# Patient Record
Sex: MALE | Race: WHITE | NOT HISPANIC OR LATINO | URBAN - METROPOLITAN AREA
[De-identification: names, ages, dates, MRNs, and addresses within clinical notes are randomized per-mention and may not be internally consistent; named-entity substitution may affect disease eponyms.]

---

## 2019-06-23 ENCOUNTER — INPATIENT (INPATIENT)
Facility: HOSPITAL | Age: 39
LOS: 0 days | Discharge: HOME | DRG: 54 | End: 2019-06-24
Attending: FAMILY MEDICINE | Admitting: FAMILY MEDICINE
Payer: COMMERCIAL

## 2019-06-23 VITALS
OXYGEN SATURATION: 96 % | RESPIRATION RATE: 18 BRPM | HEART RATE: 128 BPM | WEIGHT: 184.97 LBS | SYSTOLIC BLOOD PRESSURE: 130 MMHG | TEMPERATURE: 99 F | DIASTOLIC BLOOD PRESSURE: 70 MMHG

## 2019-06-23 DIAGNOSIS — R56.9 UNSPECIFIED CONVULSIONS: ICD-10-CM

## 2019-06-23 DIAGNOSIS — Z29.9 ENCOUNTER FOR PROPHYLACTIC MEASURES, UNSPECIFIED: ICD-10-CM

## 2019-06-23 DIAGNOSIS — R00.0 TACHYCARDIA, UNSPECIFIED: ICD-10-CM

## 2019-06-23 DIAGNOSIS — M25.311 OTHER INSTABILITY, RIGHT SHOULDER: ICD-10-CM

## 2019-06-23 LAB
ALBUMIN SERPL ELPH-MCNC: 4.3 G/DL — SIGNIFICANT CHANGE UP (ref 3.3–5)
ALP SERPL-CCNC: 61 U/L — SIGNIFICANT CHANGE UP (ref 40–120)
ALT FLD-CCNC: 40 U/L — SIGNIFICANT CHANGE UP (ref 12–78)
ANION GAP SERPL CALC-SCNC: 10 MMOL/L — SIGNIFICANT CHANGE UP (ref 5–17)
APAP SERPL-MCNC: <2 UG/ML — SIGNIFICANT CHANGE UP (ref 10–30)
APPEARANCE UR: CLEAR — SIGNIFICANT CHANGE UP
AST SERPL-CCNC: 18 U/L — SIGNIFICANT CHANGE UP (ref 15–37)
BASOPHILS # BLD AUTO: 0.06 K/UL — SIGNIFICANT CHANGE UP (ref 0–0.2)
BASOPHILS NFR BLD AUTO: 1 % — SIGNIFICANT CHANGE UP (ref 0–2)
BILIRUB SERPL-MCNC: 0.3 MG/DL — SIGNIFICANT CHANGE UP (ref 0.2–1.2)
BILIRUB UR-MCNC: NEGATIVE — SIGNIFICANT CHANGE UP
BUN SERPL-MCNC: 12 MG/DL — SIGNIFICANT CHANGE UP (ref 7–23)
CALCIUM SERPL-MCNC: 8.6 MG/DL — SIGNIFICANT CHANGE UP (ref 8.5–10.1)
CHLORIDE SERPL-SCNC: 105 MMOL/L — SIGNIFICANT CHANGE UP (ref 96–108)
CO2 SERPL-SCNC: 23 MMOL/L — SIGNIFICANT CHANGE UP (ref 22–31)
COLOR SPEC: YELLOW — SIGNIFICANT CHANGE UP
CREAT SERPL-MCNC: 1.2 MG/DL — SIGNIFICANT CHANGE UP (ref 0.5–1.3)
DIFF PNL FLD: ABNORMAL
EOSINOPHIL # BLD AUTO: 0.05 K/UL — SIGNIFICANT CHANGE UP (ref 0–0.5)
EOSINOPHIL NFR BLD AUTO: 0.8 % — SIGNIFICANT CHANGE UP (ref 0–6)
ETHANOL SERPL-MCNC: <10 MG/DL — SIGNIFICANT CHANGE UP (ref 0–10)
GLUCOSE SERPL-MCNC: 129 MG/DL — HIGH (ref 70–99)
GLUCOSE UR QL: NEGATIVE — SIGNIFICANT CHANGE UP
HCT VFR BLD CALC: 43.5 % — SIGNIFICANT CHANGE UP (ref 39–50)
HGB BLD-MCNC: 14.9 G/DL — SIGNIFICANT CHANGE UP (ref 13–17)
IMM GRANULOCYTES NFR BLD AUTO: 0.8 % — SIGNIFICANT CHANGE UP (ref 0–1.5)
KETONES UR-MCNC: ABNORMAL
LEUKOCYTE ESTERASE UR-ACNC: NEGATIVE — SIGNIFICANT CHANGE UP
LIDOCAIN IGE QN: 152 U/L — SIGNIFICANT CHANGE UP (ref 73–393)
LYMPHOCYTES # BLD AUTO: 1.79 K/UL — SIGNIFICANT CHANGE UP (ref 1–3.3)
LYMPHOCYTES # BLD AUTO: 30.3 % — SIGNIFICANT CHANGE UP (ref 13–44)
MCHC RBC-ENTMCNC: 30.5 PG — SIGNIFICANT CHANGE UP (ref 27–34)
MCHC RBC-ENTMCNC: 34.3 GM/DL — SIGNIFICANT CHANGE UP (ref 32–36)
MCV RBC AUTO: 89.1 FL — SIGNIFICANT CHANGE UP (ref 80–100)
MONOCYTES # BLD AUTO: 0.45 K/UL — SIGNIFICANT CHANGE UP (ref 0–0.9)
MONOCYTES NFR BLD AUTO: 7.6 % — SIGNIFICANT CHANGE UP (ref 2–14)
NEUTROPHILS # BLD AUTO: 3.5 K/UL — SIGNIFICANT CHANGE UP (ref 1.8–7.4)
NEUTROPHILS NFR BLD AUTO: 59.5 % — SIGNIFICANT CHANGE UP (ref 43–77)
NITRITE UR-MCNC: NEGATIVE — SIGNIFICANT CHANGE UP
NRBC # BLD: 0 /100 WBCS — SIGNIFICANT CHANGE UP (ref 0–0)
PCP SPEC-MCNC: SIGNIFICANT CHANGE UP
PH UR: 5 — SIGNIFICANT CHANGE UP (ref 5–8)
PLATELET # BLD AUTO: 230 K/UL — SIGNIFICANT CHANGE UP (ref 150–400)
POTASSIUM SERPL-MCNC: 4.1 MMOL/L — SIGNIFICANT CHANGE UP (ref 3.5–5.3)
POTASSIUM SERPL-SCNC: 4.1 MMOL/L — SIGNIFICANT CHANGE UP (ref 3.5–5.3)
PROT SERPL-MCNC: 8 G/DL — SIGNIFICANT CHANGE UP (ref 6–8.3)
PROT UR-MCNC: 25 MG/DL
RBC # BLD: 4.88 M/UL — SIGNIFICANT CHANGE UP (ref 4.2–5.8)
RBC # FLD: 11.8 % — SIGNIFICANT CHANGE UP (ref 10.3–14.5)
SALICYLATES SERPL-MCNC: 2.7 MG/DL — LOW (ref 2.8–20)
SODIUM SERPL-SCNC: 138 MMOL/L — SIGNIFICANT CHANGE UP (ref 135–145)
SP GR SPEC: 1.01 — SIGNIFICANT CHANGE UP (ref 1.01–1.02)
UROBILINOGEN FLD QL: NEGATIVE — SIGNIFICANT CHANGE UP
WBC # BLD: 5.9 K/UL — SIGNIFICANT CHANGE UP (ref 3.8–10.5)
WBC # FLD AUTO: 5.9 K/UL — SIGNIFICANT CHANGE UP (ref 3.8–10.5)

## 2019-06-23 PROCEDURE — 73030 X-RAY EXAM OF SHOULDER: CPT | Mod: 26,RT

## 2019-06-23 PROCEDURE — 99222 1ST HOSP IP/OBS MODERATE 55: CPT | Mod: GC,AI

## 2019-06-23 PROCEDURE — 70450 CT HEAD/BRAIN W/O DYE: CPT | Mod: 26

## 2019-06-23 PROCEDURE — 93010 ELECTROCARDIOGRAM REPORT: CPT

## 2019-06-23 PROCEDURE — 99285 EMERGENCY DEPT VISIT HI MDM: CPT

## 2019-06-23 RX ORDER — SODIUM CHLORIDE 9 MG/ML
1000 INJECTION INTRAMUSCULAR; INTRAVENOUS; SUBCUTANEOUS
Refills: 0 | Status: DISCONTINUED | OUTPATIENT
Start: 2019-06-23 | End: 2019-06-24

## 2019-06-23 RX ORDER — LEVETIRACETAM 250 MG/1
1000 TABLET, FILM COATED ORAL ONCE
Refills: 0 | Status: COMPLETED | OUTPATIENT
Start: 2019-06-23 | End: 2019-06-23

## 2019-06-23 RX ORDER — ACETAMINOPHEN 500 MG
650 TABLET ORAL EVERY 6 HOURS
Refills: 0 | Status: DISCONTINUED | OUTPATIENT
Start: 2019-06-23 | End: 2019-06-24

## 2019-06-23 RX ORDER — SODIUM CHLORIDE 9 MG/ML
3 INJECTION INTRAMUSCULAR; INTRAVENOUS; SUBCUTANEOUS ONCE
Refills: 0 | Status: COMPLETED | OUTPATIENT
Start: 2019-06-23 | End: 2019-06-23

## 2019-06-23 RX ORDER — LEVETIRACETAM 250 MG/1
750 TABLET, FILM COATED ORAL
Refills: 0 | Status: DISCONTINUED | OUTPATIENT
Start: 2019-06-24 | End: 2019-06-24

## 2019-06-23 RX ORDER — SODIUM CHLORIDE 9 MG/ML
1000 INJECTION INTRAMUSCULAR; INTRAVENOUS; SUBCUTANEOUS ONCE
Refills: 0 | Status: COMPLETED | OUTPATIENT
Start: 2019-06-23 | End: 2019-06-23

## 2019-06-23 RX ORDER — IBUPROFEN 200 MG
600 TABLET ORAL ONCE
Refills: 0 | Status: COMPLETED | OUTPATIENT
Start: 2019-06-23 | End: 2019-06-23

## 2019-06-23 RX ADMIN — SODIUM CHLORIDE 100 MILLILITER(S): 9 INJECTION INTRAMUSCULAR; INTRAVENOUS; SUBCUTANEOUS at 18:40

## 2019-06-23 RX ADMIN — SODIUM CHLORIDE 1000 MILLILITER(S): 9 INJECTION INTRAMUSCULAR; INTRAVENOUS; SUBCUTANEOUS at 13:30

## 2019-06-23 RX ADMIN — SODIUM CHLORIDE 1000 MILLILITER(S): 9 INJECTION INTRAMUSCULAR; INTRAVENOUS; SUBCUTANEOUS at 14:30

## 2019-06-23 RX ADMIN — SODIUM CHLORIDE 1000 MILLILITER(S): 9 INJECTION INTRAMUSCULAR; INTRAVENOUS; SUBCUTANEOUS at 15:25

## 2019-06-23 RX ADMIN — Medication 600 MILLIGRAM(S): at 19:38

## 2019-06-23 RX ADMIN — Medication 600 MILLIGRAM(S): at 20:26

## 2019-06-23 RX ADMIN — SODIUM CHLORIDE 1000 MILLILITER(S): 9 INJECTION INTRAMUSCULAR; INTRAVENOUS; SUBCUTANEOUS at 13:43

## 2019-06-23 RX ADMIN — LEVETIRACETAM 440 MILLIGRAM(S): 250 TABLET, FILM COATED ORAL at 14:25

## 2019-06-23 RX ADMIN — SODIUM CHLORIDE 1000 MILLILITER(S): 9 INJECTION INTRAMUSCULAR; INTRAVENOUS; SUBCUTANEOUS at 15:30

## 2019-06-23 RX ADMIN — Medication 1 MILLIGRAM(S): at 14:25

## 2019-06-23 RX ADMIN — SODIUM CHLORIDE 3 MILLILITER(S): 9 INJECTION INTRAMUSCULAR; INTRAVENOUS; SUBCUTANEOUS at 13:43

## 2019-06-23 RX ADMIN — LEVETIRACETAM 1000 MILLIGRAM(S): 250 TABLET, FILM COATED ORAL at 14:40

## 2019-06-23 NOTE — ED ADULT NURSE NOTE - CHPI ED NUR SYMPTOMS NEG
no weakness/no nausea/no fever/no dizziness/no numbness/no change in level of consciousness/no vomiting/no blurred vision

## 2019-06-23 NOTE — H&P ADULT - NSHPSOCIALHISTORY_GEN_ALL_CORE
Lives with partner. No children  Tobacco: former smoker, 1ppd x 10 years, quit 5 years ago. Vapes daily  Etoh: drinks on weekends. In [place] for friend's birthday for the past 3 days, went to [employer] yesterday.   Recreational drugs: denies Lives with partner. No children  Tobacco: former smoker, 1ppd x 10 years, quit 5 years ago. Vapes daily  Etoh: drinks on weekends. In [place] for friend's birthday for the past 3 days, went to [employer] yesterday.   Recreational drugs: denies  Works as

## 2019-06-23 NOTE — H&P ADULT - ATTENDING COMMENTS
Patient seen and examined. Case discussed with Dr. Giles. Agree with her documentation above, edited where necessary.

## 2019-06-23 NOTE — ED ADULT NURSE NOTE - OBJECTIVE STATEMENT
37 y/o male comes in with EMS after having a witnessed seizure. He states he does not remember what happened. Friends state he was sitting in the sun started to complain of feelings hot, moved into shade, he then yelled and started to seize. Denies Hitting his head, fevers, chest pain or shortness of breath. States he does not have a history of seizures. PIV in place, blood sample obtained. Plan of care discussed with patient. Comfort and safety maintained. Will continue to monitor.

## 2019-06-23 NOTE — H&P ADULT - ASSESSMENT
38 year old M with no significant PMH admitted for new onset seizure at home, and then again in the ED.    -admit to tele  -new onset seizure of unknown etiology  -CT head showed no definite hydrocephalus present, no midline shift however limited due to motion artifact  -electrolytes wnl, EtOH and Tylenol levels negative  -ativan PRN for seizure  -f/u Utox  -f/u UA and UCx  -f/u MR head  -f/u EEG  -Neuro, Dr. Barone, consulted 38 year old M with no significant PMH admitted for new onset seizure at home, and then again in the ED.    -admit to tele  -new onset seizure of unknown etiology  -CT head showed no definite hydrocephalus present, no midline shift however limited due to motion artifact  -electrolytes wnl, EtOH and Tylenol levels negative, glucose levels wnl  -ativan PRN for seizure  -f/u Utox  -f/u UA and UCx  -f/u MR head  -f/u EEG  -Neuro, Dr. Barone, consulted 38M w/pmh of migraines admitted for new onset seizures.

## 2019-06-23 NOTE — ED ADULT NURSE NOTE - NSIMPLEMENTINTERV_GEN_ALL_ED
Implemented All Fall Risk Interventions:  Tyler to call system. Call bell, personal items and telephone within reach. Instruct patient to call for assistance. Room bathroom lighting operational. Non-slip footwear when patient is off stretcher. Physically safe environment: no spills, clutter or unnecessary equipment. Stretcher in lowest position, wheels locked, appropriate side rails in place. Provide visual cue, wrist band, yellow gown, etc. Monitor gait and stability. Monitor for mental status changes and reorient to person, place, and time. Review medications for side effects contributing to fall risk. Reinforce activity limits and safety measures with patient and family.

## 2019-06-23 NOTE — H&P ADULT - PROBLEM SELECTOR PLAN 2
IMPROVE VTE Individual Risk Assessment          RISK                                                          Points  [  ] Previous VTE                                                3  [  ] Thrombophilia                                             2  [  ] Lower limb paralysis                                   2        (unable to hold up >15 seconds)    [  ] Current Cancer                                             2         (within 6 months)  [  ] Immobilization > 24 hrs                              1  [  ] ICU/CCU stay > 24 hours                             1  [  ] Age > 60                                                         1    IMPROVE VTE Score: 0  DVT: SCDs, fall risk -130's. Improving  Likely 2/2 dehydration  - s/p 3L normal saline  - Monitor on telemetry -130's. Improving  Likely 2/2 dehydration  - s/p 3L normal saline  - continue normal saline @100cc/hr  - Monitor on telemetry

## 2019-06-23 NOTE — ED PROVIDER NOTE - OBJECTIVE STATEMENT
Pt is a 37 yo male who presents to the ED with a cc of seizure like activity.  Pt reports that approx 20 years ago he suffered from migraines but he had not had a migraine since then, denies all other medical history.  Reports that he is visiting from [place].  Friends state that they were outside sitting around a table looking at photos.  Pt was drinking coffee and water.  They report that pt stated that he was hot and moved to a more shaded region.  Shortly after this pt yelled out and when they looked he was noted to have generalized tonic clonic seizure activity.  Episode lasted approx 20 sec.  Pt was post ictal with confusion after.  Did urinate on himself during the event.  Pt currently reports mild muscle aches.  Denies HA, visual changes, N/V, CP, SOB, abd pain, ext numbness or weakness.  Does not recall events.  Friends report pt did not strike his head during the event

## 2019-06-23 NOTE — H&P ADULT - PROBLEM SELECTOR PLAN 1
Admit to telemetry  -new onset seizure of unknown etiology. Consider heat exhaustion   -CT head showed no definite hydrocephalus present, no midline shift however limited due to motion artifact  -electrolytes wnl, EtOH and Tylenol levels negative, glucose levels wnl  - s/p Keppra 1000mg and Ativan 1mg  - Will continue Keppra 750mg bid  -ativan PRN for seizure  -f/u Utox  -f/u UA and UCx  -f/u MR head  -f/u EEG  - seizure precautions  -Neuro, Dr. Barone, consulted Admit to telemetry  -new onset seizure of unknown etiology. Consider heat exhaustion   -CT head showed no definite hydrocephalus present, no midline shift however limited due to motion artifact  -electrolytes wnl, EtOH and Tylenol levels negative, glucose levels wnl  - s/p Keppra 1000mg and Ativan 1mg  - Will continue Keppra 750mg bid  -ativan PRN for seizure  - Tylenol prn fever/mild pain  -f/u Utox  -f/u UA and UCx  -f/u MR head  -f/u EEG  - seizure precautions  -Neuro, Dr. Barone, consulted Admit to telemetry  -new onset seizure of unknown etiology. Consider heat exhaustion   -CT head showed no definite hydrocephalus present, no midline shift however limited due to motion artifact  -electrolytes wnl, EtOH and Tylenol levels negative, glucose levels wnl  - s/p Keppra 1000mg and Ativan 1mg  - Will continue Keppra 750mg bid  -ativan PRN for seizure  - Tylenol prn fever/mild pain  -f/u Utox  -f/u UA and UCx  -f/u MR head  -f/u EEG  - check AM TSH  - seizure precautions  -Neuro, Dr. Barone, consulted Admit to telemetry  -new onset seizure of unknown etiology. Consider heat exhaustion   -CT head showed no definite hydrocephalus present, no midline shift however limited due to motion artifact  -electrolytes wnl, EtOH and Tylenol levels negative, glucose levels wnl  - s/p Keppra 1000mg and Ativan 1mg  - Will continue Keppra 750mg bid  -ativan PRN for seizure  - Tylenol prn fever/mild pain  -f/u Utox  -f/u UA and UCx  -f/u MR head  -f/u EEG  - check AM TSH  - seizure precautions  - Pt agrees to HIV testing  -Neuro, Dr. Barone, consulted Admit to telemetry  -new onset seizure of unknown etiology. Consider heat exhaustion   -CT head showed no definite hydrocephalus present, no midline shift however limited due to motion artifact  -electrolytes wnl, EtOH and Tylenol levels negative, glucose levels wnl  - s/p Keppra 1000mg and Ativan 1mg  - Will continue Keppra 750mg bid  - Add ativan 1mg PRN for seizure  - Tylenol prn fever/mild pain  -f/u Utox  -f/u UA and UCx  -f/u MR head  -f/u EEG  - check CK  - check AM TSH  - seizure precautions  - Pt agrees to HIV testing  -Neuro, Dr. Barone, consulted

## 2019-06-23 NOTE — H&P ADULT - HISTORY OF PRESENT ILLNESS
38 year old M with no significant PMH coming in for seizure like activity.    Charting in progress    In the ED, T 98.6F,  --> 112, /70, RR 18 SpO2 96% on RA.  Labs all wnl.  Alcohol negative, tylenol and salicylate level negative.  Patient received keppra 1000mg IVPB x1, ativan 1mg IV x1, 1L NS bolus x3 in the ED.    EKG: sinus tachycardic   CT head: no definite hydrocephalus present, no midline shift however limited due to motion artifact 38 year old M with hx of childhood migraines coming in for seizure x2. History obtained via partner at bedside as patient post-ictal and does not remember what happened. States that pt was sitting, drinking coffee and water when he suddenly screamed and began to seize. He had whole body shaking, contracted upper extremities and urinary incontinence which lasted 30 seconds. Upon coming to ED, patient had another similar episode lasting 30 seconds. Patient is visiting from [place] for a friend's birthday and had been drinking for the past 3 days, went to [employer] yesterday. These symptoms have never happened before. Denies fevers/chills, n/v, chest pain, palpitations, sob, abdominal pain, constipation/diarrhea or edema.     In the ED, T 98.6F,  --> 112, /70, RR 18 SpO2 96% on RA.  Labs all wnl.  Alcohol negative, tylenol and salicylate level negative.  Patient received keppra 1000mg IVPB x1, ativan 1mg IV x1, 1L NS bolus x3 in the ED. Neuro evaluated pt in ED.   EKG: sinus tachycardic   CT head: no definite hydrocephalus present, no midline shift however limited due to motion artifact 38 year old M with hx of childhood migraines coming in for seizure x2. History obtained via partner at bedside as patient post-ictal and does not remember what happened. States that pt was sitting, drinking coffee and water when he suddenly screamed and began to seize. He had whole body shaking, contracted upper extremities and urinary incontinence which lasted 30 seconds. Upon coming to ED, patient had another similar episode lasting 30 seconds. Patient is visiting from [place] for a friend's birthday and had been drinking for the past 3 days, went to [employer] yesterday. These symptoms have never happened before. Denies fevers/chills, n/v, chest pain, palpitations, sob, abdominal pain, constipation/diarrhea or edema. No recent travel out of country or sick contacts.     In the ED, T 98.6F,  --> 112, /70, RR 18 SpO2 96% on RA.  Labs all wnl.  Alcohol negative, tylenol and salicylate level negative.  Patient received keppra 1000mg IVPB x1, ativan 1mg IV x1, 1L NS bolus x3 in the ED. Neuro evaluated pt in ED.   EKG: sinus tachycardic   CT head: no definite hydrocephalus present, no midline shift however limited due to motion artifact

## 2019-06-23 NOTE — CONSULT NOTE ADULT - SUBJECTIVE AND OBJECTIVE BOX
38M w/ hx R shoulder subjective instability (no history of dislocations) presents c/o R shoulder pain after seizures x2. Being admitted for seizure workup. Notes to have chronic R shoulder pain, always feels unstable. No history of true dislocations. Pt reported some mild acute on chronic pain after seizure activity. Denies numbness/tingling in affected extremity. No other bone/joint complaints.    Vital Signs Last 24 Hrs  T(C): 37 (06-23-19 @ 13:16), Max: 37 (06-23-19 @ 13:16)  T(F): 98.6 (06-23-19 @ 13:16), Max: 98.6 (06-23-19 @ 13:16)  HR: 101 (06-23-19 @ 16:21) (101 - 128)  BP: 157/86 (06-23-19 @ 16:21) (113/50 - 157/86)  BP(mean): --  RR: 18 (06-23-19 @ 16:21) (16 - 19)  SpO2: 98% (06-23-19 @ 16:21) (95% - 98%)    PAST MEDICAL & SURGICAL HISTORY:  Migraine headache  No significant past surgical history    MEDICATIONS  (STANDING):  sodium chloride 0.9%. 1000 milliLiter(s) IV Continuous <Continuous>    Allergies    penicillins (Rash)    Intolerances                            14.9   5.90  )-----------( 230      ( 23 Jun 2019 13:50 )             43.5     23 Jun 2019 13:50    138    |  105    |  12     ----------------------------<  129    4.1     |  23     |  1.20     Ca    8.6        23 Jun 2019 13:50  Mg     2.2       23 Jun 2019 13:50    TPro  8.0    /  Alb  4.3    /  TBili  0.3    /  DBili  x      /  AST  18     /  ALT  40     /  AlkPhos  61     23 Jun 2019 13:50        PE:  Gen: NAD  RUE:   Skin intact  Full AROM  5/5 RTC  2+ anterior shift and load  +apprehension  +jobes relocation  competent rotator interval  compartments soft  motor intact +ain/pin/m/r/u  SILT m/r/u  2+ radial pulse      Imaging: XR demonstrates appropriate glenohumeral articulation without hill sachs, dislocation or fracture. Mild posterior glenoid retroversion.     A/P: 38M with chronic R shoulder instability    Pt with reported subjective complaints of chronic instability without any documented/true dislocations.   Pt unstable on exam with unidirectional instability. No evidence of dislocation.   No acute intervention required.   ROM as tolerated. Avoid ABER.   Follow up prn.  Stable for DC.   Will DW Dr. Mayers.   FU on prn basis as an outpatient.

## 2019-06-23 NOTE — H&P ADULT - PROBLEM SELECTOR PLAN 3
IMPROVE VTE Individual Risk Assessment          RISK                                                          Points  [  ] Previous VTE                                                3  [  ] Thrombophilia                                             2  [  ] Lower limb paralysis                                   2        (unable to hold up >15 seconds)    [  ] Current Cancer                                             2         (within 6 months)  [  ] Immobilization > 24 hrs                              1  [  ] ICU/CCU stay > 24 hours                             1  [  ] Age > 60                                                         1    IMPROVE VTE Score: 0  DVT: SCDs, fall risk X-ray shoulder  Ortho to prudencio

## 2019-06-23 NOTE — PROVIDER CONTACT NOTE (CHANGE IN STATUS NOTIFICATION) - ACTION/TREATMENT ORDERED:
md ORDERED keppra 1gm and ativan 1mg
strengthening/ADL retraining/bed mobility training/balance training/ROM/transfer training

## 2019-06-23 NOTE — H&P ADULT - NSHPPHYSICALEXAM_GEN_ALL_CORE
Physical Exam  General: No apparent distress  Head: normocephalic, atraumatic  Eyes: EOMI, anicteric  ENT: moist mucous membranes, no pharyngeal exudates  Heart: rrr, S1, S2, no murmurs  Chest: CTA b/l, no rales, rhonchi, or wheezes  Abd: BS+, soft, NT, ND  Back: no CVA tenderness  Extr: no edema or cyanosis  Neuro: AA&Ox3, no focal weakness, sensation to light touch intact  Psych: normal affect Physical Exam  General: No apparent distress  Head: normocephalic, atraumatic  Eyes: EOMI, anicteric  ENT: moist mucous membranes, no pharyngeal exudates  Heart: +tachycardic, S1, S2, no murmurs  Chest: CTA b/l, no rales, rhonchi, or wheezes  Abd: BS+, soft, NT, ND, no guarding, no rebound  Back: erythematous patch of skin on mid-lower back, no defined lesions/ulcers  Extr: no edema or cyanosis  Neuro: AA&Ox3, no focal weakness, sensation to light touch intact  Psych: normal affect Physical Exam  General: No apparent distress  Head: normocephalic, atraumatic  Eyes: EOMI, anicteric  ENT: moist mucous membranes, no pharyngeal exudates  Heart: +tachycardic, S1, S2, no murmurs  Chest: CTA b/l, no rales, rhonchi, or wheezes  Abd: BS+, soft, NT, ND, no guarding, no rebound  Back: erythematous patch of skin on mid-lower back, no defined lesions/ulcers  Extr: no edema or cyanosis. Instability in right shoulder with abduction  Neuro: AA&Ox3, no focal weakness, sensation to light touch intact  Psych: normal affect

## 2019-06-23 NOTE — ED PROVIDER NOTE - ENMT, MLM
Airway patent, Nasal mucosa clear. Mouth with normal mucosa. Small tongue bite noted to left side of tongue

## 2019-06-23 NOTE — H&P ADULT - PROBLEM SELECTOR PLAN 4
IMPROVE VTE Individual Risk Assessment          RISK                                                          Points  [  ] Previous VTE                                                3  [  ] Thrombophilia                                             2  [  ] Lower limb paralysis                                   2        (unable to hold up >15 seconds)    [  ] Current Cancer                                             2         (within 6 months)  [  ] Immobilization > 24 hrs                              1  [  ] ICU/CCU stay > 24 hours                             1  [  ] Age > 60                                                         1    IMPROVE VTE Score: 0  DVT: SCDs, fall risk

## 2019-06-23 NOTE — H&P ADULT - NSHPREVIEWOFSYSTEMS_GEN_ALL_CORE
Review of Systems  General: no fever, chills  Eyes: no vision changes  ENT: no hearing changes, nasal congestions, or sore throat  CV: no chest pain or palpitations  Pulm: no SOB, wheezing, cough, or hemoptysis  Abd/GI: no nausea, vomiting, diarrhea, constipation, abd pain  : no dysuria, hematuria, urinary frequency  MSK: no joint pain or myalgias  Neuro: no syncope, dizziness, focal weakness  Psych: no anxiety or depression  Endo: no heat or cold intolerance Review of Systems  General: no fever, chills  Eyes: no vision changes  ENT: no hearing changes, nasal congestions, or sore throat  CV: no chest pain or palpitations  Pulm: no SOB, wheezing, cough, or hemoptysis  Abd/GI: no nausea, vomiting, diarrhea, constipation, abd pain  : no dysuria, hematuria, urinary frequency  MSK: +muscle soreness in R shoulder and b/l legs, no joint pain  Neuro: +seizures, no syncope, dizziness, focal weakness  Psych: no anxiety or depression  Endo: no heat or cold intolerance

## 2019-06-24 VITALS
TEMPERATURE: 98 F | OXYGEN SATURATION: 98 % | DIASTOLIC BLOOD PRESSURE: 70 MMHG | SYSTOLIC BLOOD PRESSURE: 114 MMHG | RESPIRATION RATE: 17 BRPM | HEART RATE: 73 BPM

## 2019-06-24 LAB
ANION GAP SERPL CALC-SCNC: 9 MMOL/L — SIGNIFICANT CHANGE UP (ref 5–17)
BASOPHILS # BLD AUTO: 0.07 K/UL — SIGNIFICANT CHANGE UP (ref 0–0.2)
BASOPHILS NFR BLD AUTO: 1 % — SIGNIFICANT CHANGE UP (ref 0–2)
BUN SERPL-MCNC: 8 MG/DL — SIGNIFICANT CHANGE UP (ref 7–23)
CALCIUM SERPL-MCNC: 7.7 MG/DL — LOW (ref 8.5–10.1)
CHLORIDE SERPL-SCNC: 113 MMOL/L — HIGH (ref 96–108)
CO2 SERPL-SCNC: 23 MMOL/L — SIGNIFICANT CHANGE UP (ref 22–31)
CREAT SERPL-MCNC: 0.89 MG/DL — SIGNIFICANT CHANGE UP (ref 0.5–1.3)
CULTURE RESULTS: NO GROWTH — SIGNIFICANT CHANGE UP
EOSINOPHIL # BLD AUTO: 0.11 K/UL — SIGNIFICANT CHANGE UP (ref 0–0.5)
EOSINOPHIL NFR BLD AUTO: 1.5 % — SIGNIFICANT CHANGE UP (ref 0–6)
GLUCOSE SERPL-MCNC: 91 MG/DL — SIGNIFICANT CHANGE UP (ref 70–99)
HCT VFR BLD CALC: 36.9 % — LOW (ref 39–50)
HGB BLD-MCNC: 12.7 G/DL — LOW (ref 13–17)
HIV 1+2 AB+HIV1 P24 AG SERPL QL IA: SIGNIFICANT CHANGE UP
IMM GRANULOCYTES NFR BLD AUTO: 0.6 % — SIGNIFICANT CHANGE UP (ref 0–1.5)
LYMPHOCYTES # BLD AUTO: 1.34 K/UL — SIGNIFICANT CHANGE UP (ref 1–3.3)
LYMPHOCYTES # BLD AUTO: 18.8 % — SIGNIFICANT CHANGE UP (ref 13–44)
MCHC RBC-ENTMCNC: 30.8 PG — SIGNIFICANT CHANGE UP (ref 27–34)
MCHC RBC-ENTMCNC: 34.4 GM/DL — SIGNIFICANT CHANGE UP (ref 32–36)
MCV RBC AUTO: 89.6 FL — SIGNIFICANT CHANGE UP (ref 80–100)
MONOCYTES # BLD AUTO: 0.5 K/UL — SIGNIFICANT CHANGE UP (ref 0–0.9)
MONOCYTES NFR BLD AUTO: 7 % — SIGNIFICANT CHANGE UP (ref 2–14)
NEUTROPHILS # BLD AUTO: 5.07 K/UL — SIGNIFICANT CHANGE UP (ref 1.8–7.4)
NEUTROPHILS NFR BLD AUTO: 71.1 % — SIGNIFICANT CHANGE UP (ref 43–77)
NRBC # BLD: 0 /100 WBCS — SIGNIFICANT CHANGE UP (ref 0–0)
PLATELET # BLD AUTO: 162 K/UL — SIGNIFICANT CHANGE UP (ref 150–400)
POTASSIUM SERPL-MCNC: 3.8 MMOL/L — SIGNIFICANT CHANGE UP (ref 3.5–5.3)
POTASSIUM SERPL-SCNC: 3.8 MMOL/L — SIGNIFICANT CHANGE UP (ref 3.5–5.3)
RBC # BLD: 4.12 M/UL — LOW (ref 4.2–5.8)
RBC # FLD: 11.9 % — SIGNIFICANT CHANGE UP (ref 10.3–14.5)
SODIUM SERPL-SCNC: 145 MMOL/L — SIGNIFICANT CHANGE UP (ref 135–145)
SPECIMEN SOURCE: SIGNIFICANT CHANGE UP
TSH SERPL-MCNC: 2.08 UIU/ML — SIGNIFICANT CHANGE UP (ref 0.36–3.74)
WBC # BLD: 7.13 K/UL — SIGNIFICANT CHANGE UP (ref 3.8–10.5)
WBC # FLD AUTO: 7.13 K/UL — SIGNIFICANT CHANGE UP (ref 3.8–10.5)

## 2019-06-24 PROCEDURE — A9579: CPT

## 2019-06-24 PROCEDURE — 70450 CT HEAD/BRAIN W/O DYE: CPT

## 2019-06-24 PROCEDURE — 73020 X-RAY EXAM OF SHOULDER: CPT

## 2019-06-24 PROCEDURE — 95816 EEG AWAKE AND DROWSY: CPT

## 2019-06-24 PROCEDURE — 87389 HIV-1 AG W/HIV-1&-2 AB AG IA: CPT

## 2019-06-24 PROCEDURE — 70553 MRI BRAIN STEM W/O & W/DYE: CPT

## 2019-06-24 PROCEDURE — 99285 EMERGENCY DEPT VISIT HI MDM: CPT | Mod: 25

## 2019-06-24 PROCEDURE — 70553 MRI BRAIN STEM W/O & W/DYE: CPT | Mod: 26

## 2019-06-24 PROCEDURE — 81001 URINALYSIS AUTO W/SCOPE: CPT

## 2019-06-24 PROCEDURE — 84443 ASSAY THYROID STIM HORMONE: CPT

## 2019-06-24 PROCEDURE — 80048 BASIC METABOLIC PNL TOTAL CA: CPT

## 2019-06-24 PROCEDURE — 80053 COMPREHEN METABOLIC PANEL: CPT

## 2019-06-24 PROCEDURE — 96375 TX/PRO/DX INJ NEW DRUG ADDON: CPT

## 2019-06-24 PROCEDURE — 80307 DRUG TEST PRSMV CHEM ANLYZR: CPT

## 2019-06-24 PROCEDURE — 36415 COLL VENOUS BLD VENIPUNCTURE: CPT

## 2019-06-24 PROCEDURE — 83690 ASSAY OF LIPASE: CPT

## 2019-06-24 PROCEDURE — 73030 X-RAY EXAM OF SHOULDER: CPT

## 2019-06-24 PROCEDURE — 96374 THER/PROPH/DIAG INJ IV PUSH: CPT

## 2019-06-24 PROCEDURE — 85027 COMPLETE CBC AUTOMATED: CPT

## 2019-06-24 PROCEDURE — 82550 ASSAY OF CK (CPK): CPT

## 2019-06-24 PROCEDURE — 87086 URINE CULTURE/COLONY COUNT: CPT

## 2019-06-24 PROCEDURE — 83735 ASSAY OF MAGNESIUM: CPT

## 2019-06-24 PROCEDURE — 93005 ELECTROCARDIOGRAM TRACING: CPT

## 2019-06-24 RX ORDER — IBUPROFEN 200 MG
600 TABLET ORAL ONCE
Refills: 0 | Status: COMPLETED | OUTPATIENT
Start: 2019-06-24 | End: 2019-06-24

## 2019-06-24 RX ORDER — DEXAMETHASONE 0.5 MG/5ML
1 ELIXIR ORAL
Qty: 90 | Refills: 0
Start: 2019-06-24 | End: 2019-07-23

## 2019-06-24 RX ORDER — LEVETIRACETAM 250 MG/1
1 TABLET, FILM COATED ORAL
Qty: 60 | Refills: 0
Start: 2019-06-24 | End: 2019-07-23

## 2019-06-24 RX ORDER — DEXAMETHASONE 0.5 MG/5ML
4 ELIXIR ORAL EVERY 8 HOURS
Refills: 0 | Status: DISCONTINUED | OUTPATIENT
Start: 2019-06-24 | End: 2019-06-24

## 2019-06-24 RX ADMIN — SODIUM CHLORIDE 100 MILLILITER(S): 9 INJECTION INTRAMUSCULAR; INTRAVENOUS; SUBCUTANEOUS at 04:25

## 2019-06-24 RX ADMIN — Medication 600 MILLIGRAM(S): at 06:38

## 2019-06-24 RX ADMIN — LEVETIRACETAM 750 MILLIGRAM(S): 250 TABLET, FILM COATED ORAL at 05:43

## 2019-06-24 RX ADMIN — Medication 4 MILLIGRAM(S): at 14:22

## 2019-06-24 RX ADMIN — Medication 600 MILLIGRAM(S): at 05:59

## 2019-06-24 NOTE — PROGRESS NOTE ADULT - SUBJECTIVE AND OBJECTIVE BOX
19 y.o. Female presents to the ED with chief complaint of suicide attempt. Pt's mother reports pt took 43 tylenol PM 1 hour PTA in attempt to harm self. Pt reports left sided abdominal pain. Pt awake, alert, oriented x4. Pt resting in bed in NAD. Side rails up x2. Pt has hx of past suicide attempt.    Neurology Follow up note    ADOLFO LOVE38yMale    HPI:  38 year old M with hx of childhood migraines coming in for seizure x2. History obtained via partner at bedside as patient post-ictal and does not remember what happened. States that pt was sitting, drinking coffee and water when he suddenly screamed and began to seize. He had whole body shaking, contracted upper extremities and urinary incontinence which lasted 30 seconds. Upon coming to ED, patient had another similar episode lasting 30 seconds. Patient is visiting from [place] for a friend's birthday and had been drinking for the past 3 days, went to [place] yesterday. These symptoms have never happened before. Denies fevers/chills, n/v, chest pain, palpitations, sob, abdominal pain, constipation/diarrhea or edema. No recent travel out of country or sick contacts.     In the ED, T 98.6F,  --> 112, /70, RR 18 SpO2 96% on RA.  Labs all wnl.  Alcohol negative, tylenol and salicylate level negative.  Patient received keppra 1000mg IVPB x1, ativan 1mg IV x1, 1L NS bolus x3 in the ED. Neuro evaluated pt in ED.   EKG: sinus tachycardic   CT head: no definite hydrocephalus present, no midline shift however limited due to motion artifact (2019 15:55)      Interval History - doing well; no recurrence of seizure.  tolerating keppra well    Patient is seen, chart was reviewed and case was discussed with the treatment team.  Pt is not in any distress.   Lying on bed comfortably.   No events reported overnight.   Sitting on chair bed comfortably.    is at bedside.    Vital Signs Last 24 Hrs  T(C): 36.8 (2019 08:49), Max: 37.2 (2019 21:18)  T(F): 98.3 (2019 08:49), Max: 99 (2019 21:18)  HR: 73 (2019 08:49) (73 - 128)  BP: 114/70 (2019 08:49) (111/71 - 157/86)  BP(mean): --  RR: 17 (2019 08:49) (16 - 19)  SpO2: 98% (2019 08:49) (95% - 99%)      Weight (kg): 83.9 (06-23 @ 13:16)    REVIEW OF SYSTEMS:    Constitutional: No fever, weight loss or fatigue  Eyes: No eye pain, visual disturbances, or discharge  ENT:  No difficulty hearing, tinnitus, vertigo; No sinus or throat pain  Neck: No pain or stiffness  Respiratory: No cough, wheezing, chills or hemoptysis  Cardiovascular: No chest pain, palpitations, shortness of breath, dizziness or leg swelling  Gastrointestinal: No abdominal or epigastric pain. No nausea, vomiting or hematemesis; No diarrhea or constipation. No melena or hematochezia.  Genitourinary: No dysuria, frequency, hematuria or incontinence  Neurological: No headaches, memory loss, loss of strength, numbness or tremors  Psychiatric: No depression, anxiety, mood swings or difficulty sleeping  Musculoskeletal: No joint pain or swelling; No muscle, back or extremity pain  Skin: No itching, burning, rashes or lesions   Lymph Nodes: No enlarged glands  Endocrine: No heat or cold intolerance; No hair loss, No h/o diabetes or thyroid dysfunction  Allergy and Immunologic: No hives or eczema    On Neurological Examination:    Mental Status - Pt is alert, awake, oriented X3. Higher functions are intact. Follows commands well and able to answer questions appropriately.  Mood and affect  normal    Speech -  Normal.     Cranial Nerves - Pupils 3 mm equal and reactive to light, extraocular eye movements intact.   Pt has no facial asymmetry. Facial sensation is intact.  Tongue - is in midline.    Muscle tone - is normal all over. Moves all extremities equally. No asymmetry is seen.      Motor Exam - 5/5 all over, No drift. No shaking or tremors.    Sensory Exam - Pin prick, temperature, joint position and vibration are intact on either side. Pt withdraws all extremities equally on stimulation. No asymmetry seen. No complaints of tingling, numbness.    Gait - Able to stand and walk unassisted.      coordination:    Finger to nose: normal    Deep tendon Reflexes - 2 plus all over.        Romberg - Negative.    Neck Supple -  Yes.     MEDICATIONS    acetaminophen   Tablet .. 650 milliGRAM(s) Oral every 6 hours PRN  levETIRAcetam 750 milliGRAM(s) Oral two times a day  LORazepam   Injectable 1 milliGRAM(s) IV Push once PRN      Allergies    penicillins (Rash)    Intolerances        LABS:  CBC Full  -  ( 2019 06:04 )  WBC Count : 7.13 K/uL  RBC Count : 4.12 M/uL  Hemoglobin : 12.7 g/dL  Hematocrit : 36.9 %  Platelet Count - Automated : 162 K/uL  Mean Cell Volume : 89.6 fl  Mean Cell Hemoglobin : 30.8 pg      Urinalysis Basic - ( 2019 16:45 )    Color: Yellow / Appearance: Clear / S.015 / pH: x  Gluc: x / Ketone: Trace  / Bili: Negative / Urobili: Negative   Blood: x / Protein: 25 mg/dL / Nitrite: Negative   Leuk Esterase: Negative / RBC: 0-2 /HPF / WBC Negative   Sq Epi: x / Non Sq Epi: Negative / Bacteria: Occasional          145  |  113<H>  |  8   ----------------------------<  91  3.8   |  23  |  0.89    Ca    7.7<L>      2019 06:04  Mg     2.2         TPro  8.0  /  Alb  4.3  /  TBili  0.3  /  DBili  x   /  AST  18  /  ALT  40  /  AlkPhos  61      Hemoglobin A1C:     Vitamin B12     RADIOLOGY  < from: MR Head w/wo IV Cont (19 @ 09:42) >    EXAM:  MR BRAIN WAW IC                            PROCEDURE DATE:  2019          INTERPRETATION:  .    CLINICAL INFORMATION: Neoplasm, mesial temporal sclerosis. New onset   general tonic-clonic seizure.    TECHNIQUE: Multiplanar multisequential MRI of the brain was acquired with   and without the administration of IV gadolinium. 8.5 cc's of IV Gadavist   was administered for the purposes of this examination. 1.5 cc was   discarded.    COMPARISON: Prior head CT examination from 2019.    FINDINGS: There is a mixed signal extra-axial left frontal convexity mass   which enhances fairly homogeneously. A dural tail is notable off the left   frontal convexity. Left frontal convexity hyperostosis is also noted. The   approximate dimensions of this mass are 4.1 x 3.1 x 3.1 cm (AP x TRV x   CC). There is regional mass effect with mild surrounding vasogenic edema   and deformity of the left frontal horn of the lateral ventricle. There is   no shift of the midline structures or herniation.    Otherwise, the brain parenchyma demonstrates normal signal and   morphology. There is no evidence of restricted diffusion on the   diffusion-weighted images.    There is no hydrocephalus or abnormal extra axial fluid collection.   Flow-voids are noted throughout the major intracranial vessels, on the T2   weighted images, consistent with their patency. The sella turcica and   posterior fossa are unremarkable.    There is mild scattered mucosal thickening throughout the paranasal   sinuses. There is complete opacification of the left sphenoid sinus. The   mastoid air cells remain well aerated. Calvarial signal is within normal   limits. The orbits appear unremarkable.    IMPRESSION: Large left frontal convexity meningioma with mild associated   surrounding vasogenic edema.        ISA BARRETT M.D., ATTENDING RADIOLOGIST  This document has been electronically signed. 2019 10:05AM          ASSESSMENT AND PLAN:      new onset GTC X 2 LIKELY ETIOLOGY  LARGE LEFT FRONTAL MENINGIOMA WITH MILD VASOGENIC EDEMA/MASS EFFECT.      CONTINUE KEPPRA 750 MG BID.  ADD DECADRON 4 MG TID UNTIL SEEN BY NS  NO DRIVING FOR 6 MONTH  I HAVE LENGTHY DISCUSSION WITH PATIENT AND HIS GIRL FRIEND.  NS EVALUATION ADVISED BUT PATIENT PREFER TO SEE NS IN [place])  RECOMMEND TO SEE NS(NEURO SURGEON) AS OUT PATIENT SOONER THAN LATER  NEURO WISE STABLE FOR DC,  Pain is accessed and addressed.  Plan of care was discussed with family. Questions answered.

## 2019-06-24 NOTE — DISCHARGE NOTE PROVIDER - PROVIDER TOKENS
FREE:[LAST:[Manuel],FIRST:[Neftali],PHONE:[(   )    -],FAX:[(   )    -],ADDRESS:[[address] [place] [address]]]

## 2019-06-24 NOTE — DISCHARGE NOTE PROVIDER - HOSPITAL COURSE
38M w/ pmh of migraines admitted for new onset seizures. Electrolytes wnl, EtOH and Tylenol levels negative, glucose levels wnl. Received Keprra 1000mg, started on Keprra 750mg BID. Started on ativan 1mg PRN for seizure. Received IVF for tachycardia. Ortho, Dr. Mayers, evaluated patient for right shoulder instability. There was no evidence of dislocation, no acute intervention required. Neuro, Dr. Barone, evaluated patient. MRI with contrast showed  Large left frontal convexity meningioma with mild associated surrounding vasogenic edema. Patient started on oral Decadron, patient preferred to follow up with outpatient neurosurgeon in [place]. Prescription for Decadron and Keppra sent to pharmacy in [place]. Advised patient to follow up in 1-2 days with neurosurgeon.  Advised patient not to drive for 6 months. Patient stable for discharge with very close outpatient follow up, patient in agreement.             Vital Signs Last 24 Hrs    T(C): 36.8 (24 Jun 2019 08:49), Max: 37.2 (23 Jun 2019 21:18)    T(F): 98.3 (24 Jun 2019 08:49), Max: 99 (23 Jun 2019 21:18)    HR: 73 (24 Jun 2019 08:49) (73 - 124)    BP: 114/70 (24 Jun 2019 08:49) (111/71 - 157/86)    BP(mean): --    RR: 17 (24 Jun 2019 08:49) (16 - 19)    SpO2: 98% (24 Jun 2019 08:49) (95% - 99%) 38M w/ pmh of migraines admitted for new onset seizures. Electrolytes wnl, EtOH and Tylenol levels negative, glucose levels wnl. Received Keprra 1000mg, started on Keprra 750mg BID. Started on ativan 1mg PRN for seizure. Received IVF for tachycardia. Ortho, Dr. Mayers, evaluated patient for right shoulder instability. There was no evidence of dislocation, no acute intervention required. Neuro, Dr. Barone, evaluated patient. MRI with contrast showed  Large left frontal convexity meningioma with mild associated surrounding vasogenic edema. Patient started on oral Decadron, patient preferred to follow up with outpatient neurosurgeon in [place]. Prescription for Decadron and Keppra sent to pharmacy in [place]. Advised patient to follow up in 1-2 days with neurosurgeon.  Advised patient not to drive for 6 months. Patient stable for discharge with very close outpatient follow up, patient in agreement.             Vital Signs Last 24 Hrs    T(C): 36.8 (24 Jun 2019 08:49), Max: 37.2 (23 Jun 2019 21:18)    T(F): 98.3 (24 Jun 2019 08:49), Max: 99 (23 Jun 2019 21:18)    HR: 73 (24 Jun 2019 08:49) (73 - 124)    BP: 114/70 (24 Jun 2019 08:49) (111/71 - 157/86)    BP(mean): --    RR: 17 (24 Jun 2019 08:49) (16 - 19)    SpO2: 98% (24 Jun 2019 08:49) (95% - 99%)    Physical Exam:    General: Well developed, well nourished, NAD    HEENT: NCAT, PERRLA, EOMI bl, moist mucous membranes     Neck: Supple, nontender, no mass    Neurology: A&Ox3, nonfocal, CN II-XII grossly intact, sensation intact, no gait abnormalities     Respiratory: CTA B/L, No W/R/R    CV: RRR, +S1/S2, no murmurs, rubs or gallops    Abdominal: Soft, NT, ND +BSx4    Extremities: No C/C/E    Skin: warm, dry, normal color 38M w/ pmh of migraines admitted for new onset seizures. Electrolytes wnl, EtOH and Tylenol levels negative, glucose levels wnl. Received Keprra 1000mg, started on Keprra 750mg BID. Started on ativan 1mg PRN for seizure. Received IVF for tachycardia. Ortho, Dr. Mayers, evaluated patient for right shoulder instability. There was no evidence of dislocation, no acute intervention required. Neuro, Dr. Barone, evaluated patient. MRI with contrast showed  Large left frontal convexity meningioma with mild associated surrounding vasogenic edema. Patient started on oral Decadron, patient preferred to follow up with outpatient neurosurgeon in [place]. Prescription for Decadron and Keppra sent to pharmacy in [place]. Advised patient to follow up in 1-2 days with neurosurgeon.  Advised patient not to drive for 6 months. Patient stable for discharge with out patient d/u with Neurosurgery as soon as possible,  patient in agreement but will do as soon as he gets back to [place]. Advised patient not to drive. Family at bedside , will drive patient.             Vital Signs Last 24 Hrs    T(C): 36.8 (24 Jun 2019 08:49), Max: 37.2 (23 Jun 2019 21:18)    T(F): 98.3 (24 Jun 2019 08:49), Max: 99 (23 Jun 2019 21:18)    HR: 73 (24 Jun 2019 08:49) (73 - 124)    BP: 114/70 (24 Jun 2019 08:49) (111/71 - 157/86)    BP(mean): --    RR: 17 (24 Jun 2019 08:49) (16 - 19)    SpO2: 98% (24 Jun 2019 08:49) (95% - 99%)    Physical Exam:    General: Well developed, well nourished, NAD    HEENT: NCAT, PERRLA, EOMI bl, moist mucous membranes     Neck: Supple, nontender, no mass    Neurology: A&Ox3, nonfocal, CN II-XII grossly intact, sensation intact, no gait abnormalities     Respiratory: CTA B/L, No W/R/R    CV: RRR, +S1/S2, no murmurs, rubs or gallops    Abdominal: Soft, NT, ND +BSx4    Extremities: No C/C/E    Skin: warm, dry, normal color

## 2019-06-24 NOTE — PHARMACOTHERAPY INTERVENTION NOTE - COMMENTS
Reviewed patient discharge medications: levetiracetam, dexamethasone, uses, effects and administration information. Patient inquired as to length of time he would be on meds. I advised him to discuss with neurologist with whom he has an appointment.

## 2019-06-24 NOTE — DISCHARGE NOTE PROVIDER - CARE PROVIDER_API CALL
Neftali Jackson  [address] [place] [id number]  Phone: (   )    -  Fax: (   )    -  Follow Up Time:

## 2019-06-24 NOTE — DISCHARGE NOTE PROVIDER - NSDCCPCAREPLAN_GEN_ALL_CORE_FT
PRINCIPAL DISCHARGE DIAGNOSIS  Diagnosis: Seizure  Assessment and Plan of Treatment: You seizure was likely due to a meningoma found on MRI. Start Decadron and Keppra (available at your pharmacy). It is important to follow up with the neurosurgeon in 1-2 days. Do not drive for 6 months or until cleared by your neurosurgeon. PRINCIPAL DISCHARGE DIAGNOSIS  Diagnosis: Seizure  Assessment and Plan of Treatment: You seizure was likely due to a meningoma found on MRI. Start Decadron and Keppra (available at your pharmacy). It is important to follow up with the neurosurgeon in 1-2 days. Do not drive until cleared by your neurosurgeon.
